# Patient Record
Sex: MALE | Race: WHITE | URBAN - METROPOLITAN AREA
[De-identification: names, ages, dates, MRNs, and addresses within clinical notes are randomized per-mention and may not be internally consistent; named-entity substitution may affect disease eponyms.]

---

## 2020-07-10 ENCOUNTER — TELEPHONE ENCOUNTER (OUTPATIENT)
Dept: URBAN - METROPOLITAN AREA CLINIC 74 | Facility: CLINIC | Age: 65
End: 2020-07-10

## 2020-08-23 PROBLEM — 428283002: Status: ACTIVE | Noted: 2020-08-23

## 2020-08-23 PROBLEM — 266997008: Status: ACTIVE | Noted: 2020-08-23

## 2020-08-24 ENCOUNTER — OFFICE VISIT (OUTPATIENT)
Dept: URBAN - METROPOLITAN AREA CLINIC 74 | Facility: CLINIC | Age: 65
End: 2020-08-24
Payer: COMMERCIAL

## 2020-08-24 ENCOUNTER — ERX REFILL RESPONSE (OUTPATIENT)
Dept: URBAN - METROPOLITAN AREA CLINIC 74 | Facility: CLINIC | Age: 65
End: 2020-08-24

## 2020-08-24 DIAGNOSIS — K44.9 HIATAL HERNIA: ICD-10-CM

## 2020-08-24 DIAGNOSIS — Z86.010 PERSONAL HISTORY OF COLONIC POLYPS: ICD-10-CM

## 2020-08-24 DIAGNOSIS — Z79.01 CHRONIC ANTICOAGULATION: ICD-10-CM

## 2020-08-24 DIAGNOSIS — Z90.49 HISTORY OF CHOLECYSTECTOMY: ICD-10-CM

## 2020-08-24 DIAGNOSIS — K64.8 INTERNAL HEMORRHOIDS: ICD-10-CM

## 2020-08-24 DIAGNOSIS — Z95.5 STENTED CORONARY ARTERY: ICD-10-CM

## 2020-08-24 DIAGNOSIS — Z87.19 HISTORY OF CHRONIC GASTRITIS: ICD-10-CM

## 2020-08-24 DIAGNOSIS — R19.7 POSTCHOLECYSTECTOMY DIARRHEA: ICD-10-CM

## 2020-08-24 DIAGNOSIS — E66.9 OBESITY: ICD-10-CM

## 2020-08-24 DIAGNOSIS — N19 RENAL FAILURE: ICD-10-CM

## 2020-08-24 DIAGNOSIS — K21.0 GASTROESOPHAGEAL REFLUX DISEASE WITH ESOPHAGITIS: ICD-10-CM

## 2020-08-24 PROCEDURE — 3017F COLORECTAL CA SCREEN DOC REV: CPT | Performed by: INTERNAL MEDICINE

## 2020-08-24 PROCEDURE — G8419 CALC BMI OUT NRM PARAM NOF/U: HCPCS | Performed by: INTERNAL MEDICINE

## 2020-08-24 PROCEDURE — G8427 DOCREV CUR MEDS BY ELIG CLIN: HCPCS | Performed by: INTERNAL MEDICINE

## 2020-08-24 PROCEDURE — 1036F TOBACCO NON-USER: CPT | Performed by: INTERNAL MEDICINE

## 2020-08-24 PROCEDURE — 99213 OFFICE O/P EST LOW 20 MIN: CPT | Performed by: INTERNAL MEDICINE

## 2020-08-24 RX ORDER — COLESEVELAM HYDROCHLORIDE 625 MG/1
TAKE 2 TABS 1 HOUR PRIOR TO LUNCH TABLET, FILM COATED ORAL
Qty: 180 | Refills: 3 | Status: ACTIVE | COMMUNITY
Start: 2020-02-16 | End: 2021-02-10

## 2020-08-24 RX ORDER — ESOMEPRAZOLE MAGNESIUM 40 MG/1
1 CAPSULE ORALLY ONCE A DAY CAPSULE, DELAYED RELEASE ORAL
Qty: 90 | Refills: 3 | OUTPATIENT
Start: 2020-08-24

## 2020-08-24 RX ORDER — CALCITRIOL 0.25 UG/1
TAKE 1 CAPSULE (0.25 MCG) BY ORAL ROUTE ONCE DAILY CAPSULE ORAL 1
Qty: 0 | Refills: 0 | Status: ON HOLD | COMMUNITY
Start: 1900-01-01

## 2020-08-24 RX ORDER — TAMSULOSIN HYDROCHLORIDE 0.4 MG/1
TAKE 1 CAPSULE (0.4 MG) BY ORAL ROUTE ONCE DAILY 1/2 HOUR FOLLOWING THE SAME MEAL EACH DAY CAPSULE ORAL 1
Qty: 0 | Refills: 0 | Status: ON HOLD | COMMUNITY
Start: 1900-01-01

## 2020-08-24 RX ORDER — COLCHICINE 0.6 MG/1
TAKE 1 CAPSULE (0.6 MG) BY ORAL ROUTE ONCE DAILY CAPSULE ORAL 1
Qty: 0 | Refills: 0 | Status: ACTIVE | COMMUNITY
Start: 1900-01-01

## 2020-08-24 RX ORDER — ESOMEPRAZOLE MAGNESIUM 40 MG/1
1 CAPSULE CAPSULE, DELAYED RELEASE ORAL ONCE A DAY
Qty: 90 | Refills: 3 | OUTPATIENT

## 2020-08-24 RX ORDER — EZETIMIBE 10 MG/1
TAKE 1 TABLET (10 MG) BY ORAL ROUTE ONCE DAILY TABLET ORAL 1
Qty: 0 | Refills: 0 | Status: ON HOLD | COMMUNITY
Start: 1900-01-01

## 2020-08-24 RX ORDER — OMEPRAZOLE 40 MG/1
PLEASE SPECIFY DIRECTIONS, REFILLS AND QUANTITY CAPSULE, DELAYED RELEASE ORAL
Qty: 1 CAPSULE | Refills: 0 | OUTPATIENT

## 2020-08-24 RX ORDER — SOTALOL HYDROCHLORIDE 80 MG/1
TAKE 1 TABLET BY ORAL ROUTE DAILY TABLET ORAL 1
Qty: 0 | Refills: 0 | Status: ON HOLD | COMMUNITY
Start: 1900-01-01

## 2020-08-24 RX ORDER — CYCLOBENZAPRINE HYDROCHLORIDE 10 MG/1
TAKE 1 TABLET (10 MG) BY ORAL ROUTE 2 TIMES PER DAY TABLET, FILM COATED ORAL 2
Qty: 0 | Refills: 0 | Status: ON HOLD | COMMUNITY
Start: 1900-01-01

## 2020-08-24 RX ORDER — APIXABAN 5 MG/1
TAKE 1 TABLET (5 MG) BY ORAL ROUTE 2 TIMES PER DAY TABLET, FILM COATED ORAL 2
Qty: 0 | Refills: 0 | Status: ACTIVE | COMMUNITY
Start: 1900-01-01

## 2020-08-24 RX ORDER — TORSEMIDE 20 MG/1
TAKE 1 TABLET (20 MG) BY ORAL ROUTE ONCE DAILY TABLET ORAL 1
Qty: 0 | Refills: 0 | Status: ACTIVE | COMMUNITY
Start: 1900-01-01

## 2020-08-24 RX ORDER — COLESEVELAM HYDROCHLORIDE 625 MG/1
TAKE 2 TABS 1 HOUR PRIOR TO LUNCH TABLET, FILM COATED ORAL ONCE A DAY
Qty: 180 | Refills: 3

## 2020-08-24 RX ORDER — RAMIPRIL 5 MG/1
TAKE 1 CAPSULE (5 MG) BY ORAL ROUTE ONCE DAILY CAPSULE ORAL 1
Qty: 0 | Refills: 0 | Status: ON HOLD | COMMUNITY
Start: 1900-01-01

## 2020-08-24 RX ORDER — LOVASTATIN 20 MG/1
TAKE 1 TABLET (20 MG) BY ORAL ROUTE ONCE DAILY WITH THE EVENING MEAL TABLET ORAL 1
Qty: 0 | Refills: 0 | Status: ON HOLD | COMMUNITY
Start: 1900-01-01

## 2020-08-24 RX ORDER — FEBUXOSTAT 40 MG/1
TAKE 1 TABLET (40 MG) BY ORAL ROUTE ONCE DAILY TABLET ORAL 1
Qty: 0 | Refills: 0 | Status: ACTIVE | COMMUNITY
Start: 1900-01-01

## 2020-08-24 NOTE — PHYSICAL EXAM CONSTITUTIONAL:
Morbidly obese, well developed, well nourished , in no acute distress , ambulating without difficulty , normal communication ability

## 2020-08-24 NOTE — HPI-TODAY'S VISIT:
Today August 24, 2020 the patient returns for a follow-up visit, the patient was last seen August 22, 2019 with a history of gastroesophageal reflux, a hiatal hernia, chronic superficial gastritis with no bleeding a change in bowel habit, history of adenomatous colonic polyps, sigmoid diverticulosis, internal hemorrhoids, the patient was morbidly obese, has coronary artery disease, atrial fibrillation on chronic anticoagulation, is diabetic and has renal failure. In May 2019 an EGD showed changes compatible with either esophagitis or eosinophilic esophagitis, chronic gastritis, hiatal hernia.  The patient had a colonoscopy which revealed a 3 mm cecal adenomatous polyp, sigmoid diverticulosis and internal hemorrhoids.  Patient was advised to have a colonoscopy in 5 years, follow antireflux measures and diet as well as a high-fiber diet.  The patient claimed that he did not respond to therapy with pantoprazole and continued to have heartburn, he denied having any dysphagia.  The patient requested to be treated with Nexium.  Once the patient was placed on esomeprazole 40 mg daily he appeared to be doing well. Currently the patient is grieving the loss of his wife, he cites that he claims that he is doing well as long as he takes Nexium 40 mg a day and WelChol 2 tablets in the a.m. The patient denies having any dysphagia, odynophagia, nausea, vomiting, the patient claims having normal regular bowel movements with no evidence of any rectal bleeding. The patient will continue current medications and diet and return for a follow-up visit in 1 year.

## 2020-12-01 ENCOUNTER — TELEPHONE ENCOUNTER (OUTPATIENT)
Dept: URBAN - METROPOLITAN AREA CLINIC 74 | Facility: CLINIC | Age: 65
End: 2020-12-01

## 2021-08-05 ENCOUNTER — ERX REFILL RESPONSE (OUTPATIENT)
Dept: URBAN - METROPOLITAN AREA CLINIC 74 | Facility: CLINIC | Age: 66
End: 2021-08-05

## 2021-08-05 RX ORDER — COLESEVELAM HYDROCHLORIDE 625 MG/1
TAKE 2 TABS 1 HOUR PRIOR TO LUNCH TABLET, FILM COATED ORAL ONCE A DAY
Qty: 180 | Refills: 3 | OUTPATIENT

## 2021-08-05 RX ORDER — COLESEVELAM HYDROCHLORIDE 625 MG/1
TAKE 2 TABLETS BY MOUTH 1 HOURS PRIOR TO LUNCH TABLET, COATED ORAL
Qty: 180 TABLET | Refills: 4 | OUTPATIENT

## 2021-08-25 ENCOUNTER — DASHBOARD ENCOUNTERS (OUTPATIENT)
Age: 66
End: 2021-08-25

## 2021-08-25 ENCOUNTER — OFFICE VISIT (OUTPATIENT)
Dept: URBAN - METROPOLITAN AREA CLINIC 74 | Facility: CLINIC | Age: 66
End: 2021-08-25

## 2021-09-02 ENCOUNTER — OFFICE VISIT (OUTPATIENT)
Dept: URBAN - METROPOLITAN AREA CLINIC 74 | Facility: CLINIC | Age: 66
End: 2021-09-02

## 2021-09-02 RX ORDER — CYCLOBENZAPRINE HYDROCHLORIDE 10 MG/1
TAKE 1 TABLET (10 MG) BY ORAL ROUTE 2 TIMES PER DAY TABLET, FILM COATED ORAL 2
Qty: 0 | Refills: 0 | Status: ON HOLD | COMMUNITY
Start: 1900-01-01

## 2021-09-02 RX ORDER — COLCHICINE 0.6 MG/1
TAKE 1 CAPSULE (0.6 MG) BY ORAL ROUTE ONCE DAILY CAPSULE ORAL 1
Qty: 0 | Refills: 0 | Status: ACTIVE | COMMUNITY
Start: 1900-01-01

## 2021-09-02 RX ORDER — EZETIMIBE 10 MG/1
TAKE 1 TABLET (10 MG) BY ORAL ROUTE ONCE DAILY TABLET ORAL 1
Qty: 0 | Refills: 0 | Status: ON HOLD | COMMUNITY
Start: 1900-01-01

## 2021-09-02 RX ORDER — CALCITRIOL 0.25 UG/1
TAKE 1 CAPSULE (0.25 MCG) BY ORAL ROUTE ONCE DAILY CAPSULE ORAL 1
Qty: 0 | Refills: 0 | Status: ON HOLD | COMMUNITY
Start: 1900-01-01

## 2021-09-02 RX ORDER — TORSEMIDE 20 MG/1
TAKE 1 TABLET (20 MG) BY ORAL ROUTE ONCE DAILY TABLET ORAL 1
Qty: 0 | Refills: 0 | Status: ACTIVE | COMMUNITY
Start: 1900-01-01

## 2021-09-02 RX ORDER — RAMIPRIL 5 MG/1
TAKE 1 CAPSULE (5 MG) BY ORAL ROUTE ONCE DAILY CAPSULE ORAL 1
Qty: 0 | Refills: 0 | Status: ON HOLD | COMMUNITY
Start: 1900-01-01

## 2021-09-02 RX ORDER — OMEPRAZOLE 40 MG/1
PLEASE SPECIFY DIRECTIONS, REFILLS AND QUANTITY CAPSULE, DELAYED RELEASE ORAL
Qty: 1 CAPSULE | Refills: 0 | Status: ACTIVE | COMMUNITY

## 2021-09-02 RX ORDER — APIXABAN 5 MG/1
TAKE 1 TABLET (5 MG) BY ORAL ROUTE 2 TIMES PER DAY TABLET, FILM COATED ORAL 2
Qty: 0 | Refills: 0 | Status: ACTIVE | COMMUNITY
Start: 1900-01-01

## 2021-09-02 RX ORDER — LOVASTATIN 20 MG/1
TAKE 1 TABLET (20 MG) BY ORAL ROUTE ONCE DAILY WITH THE EVENING MEAL TABLET ORAL 1
Qty: 0 | Refills: 0 | Status: ON HOLD | COMMUNITY
Start: 1900-01-01

## 2021-09-02 RX ORDER — COLESEVELAM HYDROCHLORIDE 625 MG/1
TAKE 2 TABLETS BY MOUTH 1 HOURS PRIOR TO LUNCH TABLET, COATED ORAL
Qty: 180 TABLET | Refills: 4 | Status: ACTIVE | COMMUNITY

## 2021-09-02 RX ORDER — SOTALOL HYDROCHLORIDE 80 MG/1
TAKE 1 TABLET BY ORAL ROUTE DAILY TABLET ORAL 1
Qty: 0 | Refills: 0 | Status: ON HOLD | COMMUNITY
Start: 1900-01-01

## 2021-09-02 RX ORDER — TAMSULOSIN HYDROCHLORIDE 0.4 MG/1
TAKE 1 CAPSULE (0.4 MG) BY ORAL ROUTE ONCE DAILY 1/2 HOUR FOLLOWING THE SAME MEAL EACH DAY CAPSULE ORAL 1
Qty: 0 | Refills: 0 | Status: ON HOLD | COMMUNITY
Start: 1900-01-01

## 2021-09-02 RX ORDER — FEBUXOSTAT 40 MG/1
TAKE 1 TABLET (40 MG) BY ORAL ROUTE ONCE DAILY TABLET ORAL 1
Qty: 0 | Refills: 0 | Status: ACTIVE | COMMUNITY
Start: 1900-01-01